# Patient Record
Sex: FEMALE | Race: WHITE | Employment: UNEMPLOYED | URBAN - METROPOLITAN AREA
[De-identification: names, ages, dates, MRNs, and addresses within clinical notes are randomized per-mention and may not be internally consistent; named-entity substitution may affect disease eponyms.]

---

## 2022-05-29 ENCOUNTER — HOSPITAL ENCOUNTER (EMERGENCY)
Age: 67
Discharge: HOME | End: 2022-05-29
Payer: COMMERCIAL

## 2022-05-29 ENCOUNTER — APPOINTMENT (OUTPATIENT)
Dept: GENERAL RADIOLOGY | Age: 67
End: 2022-05-29
Payer: COMMERCIAL

## 2022-05-29 VITALS
BODY MASS INDEX: 27.3 KG/M2 | WEIGHT: 144.62 LBS | HEIGHT: 61 IN | HEART RATE: 78 BPM | OXYGEN SATURATION: 98 % | SYSTOLIC BLOOD PRESSURE: 142 MMHG | DIASTOLIC BLOOD PRESSURE: 78 MMHG | RESPIRATION RATE: 16 BRPM | TEMPERATURE: 98.1 F
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
## 2022-05-29 DIAGNOSIS — S61.451A DOG BITE OF RIGHT HAND, INITIAL ENCOUNTER: Primary | ICD-10-CM

## 2022-05-29 LAB
ANION GAP SERPL CALCULATED.3IONS-SCNC: 15 MMOL/L (ref 3–16)
BASOPHILS ABSOLUTE: 0 K/UL (ref 0–0.2)
BASOPHILS RELATIVE PERCENT: 0.8 %
BUN BLDV-MCNC: 13 MG/DL (ref 7–20)
CALCIUM SERPL-MCNC: 9.1 MG/DL (ref 8.3–10.6)
CHLORIDE BLD-SCNC: 101 MMOL/L (ref 99–110)
CO2: 23 MMOL/L (ref 21–32)
CREAT SERPL-MCNC: 0.6 MG/DL (ref 0.6–1.2)
EOSINOPHILS ABSOLUTE: 0.1 K/UL (ref 0–0.6)
EOSINOPHILS RELATIVE PERCENT: 2.4 %
GFR AFRICAN AMERICAN: >60
GFR NON-AFRICAN AMERICAN: >60
GLUCOSE BLD-MCNC: 99 MG/DL (ref 70–99)
HCT VFR BLD CALC: 43.4 % (ref 36–48)
HEMOGLOBIN: 14.4 G/DL (ref 12–16)
LACTIC ACID: 1.1 MMOL/L (ref 0.4–2)
LYMPHOCYTES ABSOLUTE: 1.2 K/UL (ref 1–5.1)
LYMPHOCYTES RELATIVE PERCENT: 21.5 %
MCH RBC QN AUTO: 29 PG (ref 26–34)
MCHC RBC AUTO-ENTMCNC: 33.2 G/DL (ref 31–36)
MCV RBC AUTO: 87.3 FL (ref 80–100)
MONOCYTES ABSOLUTE: 0.3 K/UL (ref 0–1.3)
MONOCYTES RELATIVE PERCENT: 5.7 %
NEUTROPHILS ABSOLUTE: 3.7 K/UL (ref 1.7–7.7)
NEUTROPHILS RELATIVE PERCENT: 69.6 %
PDW BLD-RTO: 13.5 % (ref 12.4–15.4)
PLATELET # BLD: 231 K/UL (ref 135–450)
PMV BLD AUTO: 8.8 FL (ref 5–10.5)
POTASSIUM SERPL-SCNC: 4.1 MMOL/L (ref 3.5–5.1)
RBC # BLD: 4.97 M/UL (ref 4–5.2)
SODIUM BLD-SCNC: 139 MMOL/L (ref 136–145)
WBC # BLD: 5.4 K/UL (ref 4–11)

## 2022-05-29 PROCEDURE — 83605 ASSAY OF LACTIC ACID: CPT

## 2022-05-29 PROCEDURE — 80048 BASIC METABOLIC PNL TOTAL CA: CPT

## 2022-05-29 PROCEDURE — 99284 EMERGENCY DEPT VISIT MOD MDM: CPT

## 2022-05-29 PROCEDURE — 36415 COLL VENOUS BLD VENIPUNCTURE: CPT

## 2022-05-29 PROCEDURE — 87040 BLOOD CULTURE FOR BACTERIA: CPT

## 2022-05-29 PROCEDURE — 73130 X-RAY EXAM OF HAND: CPT

## 2022-05-29 PROCEDURE — 85025 COMPLETE CBC W/AUTO DIFF WBC: CPT

## 2022-05-29 RX ORDER — ACETAMINOPHEN 500 MG
1000 TABLET ORAL ONCE
Status: DISCONTINUED | OUTPATIENT
Start: 2022-05-29 | End: 2022-05-29 | Stop reason: HOSPADM

## 2022-05-29 NOTE — ED PROVIDER NOTES
**ADVANCED PRACTICE PROVIDER, I HAVE EVALUATED THIS PATIENT**        [address]      Pt Name: Sandhya Quintana  ZFZ:[id number]  [place] 1955  Date of evaluation: 5/29/2022  Provider: Nicolette Aguilera PA-C      Chief Complaint:    Chief Complaint   Patient presents with    Animal Bite     Bit by neighbor's dog on right hand/wrist area on Tuesday. Was seen in ED and steri-stripped. Was to follow-up next week but has had on & off fevers with drainage. Seen at Urgent Care this morning and sent here. Nursing Notes, Past Medical Hx, Past Surgical Hx, Social Hx, Allergies, and Family Hx were all reviewed and agreed with or any disagreements were addressed in the HPI.    HPI: (Location, Duration, Timing, Severity, Quality, Assoc Sx, Context, Modifying factors)    Chief Complaint of complaint of here for wound check for dog bite to her right hand. She was bit by her neighbors dog on Tuesday. Says she has had some off-and-on fever. And she noticed drainage from her hand. She denies fever at this time. No lightheaded or dizziness. No chest pain, no weakness. She went to urgent care and they told her to come to the emergency room. She has Steri-Strips and suture in place. Denies numbness or tingling. No other complaints. This is a  69 y.o. female who presents to the emergency room with the above complaint.     PastMedical/Surgical History:      Diagnosis Date    Hyperlipidemia     Thyroid disease          Procedure Laterality Date    APPENDECTOMY      FISTULAGRAM      30 years ago        Medications:  Previous Medications    AMOXICILLIN-POT CLAVULANATE (AUGMENTIN PO)    Take by mouth    ATORVASTATIN CALCIUM PO    Take by mouth    CYANOCOBALAMIN (VITAMIN B-12 PO)    Take by mouth    LEVOTHYROXINE SODIUM PO    Take by mouth    VITAMIN D PO    Take by mouth         Review of Systems:  (2-9 systems needed)  Review of Systems Constitutional: Negative for chills and fever. HENT: Negative for congestion and sore throat. Eyes: Negative for pain and visual disturbance. Respiratory: Negative for cough and shortness of breath. Cardiovascular: Negative for chest pain and leg swelling. Gastrointestinal: Negative for abdominal pain, nausea and vomiting. Genitourinary: Negative for dysuria and frequency. Musculoskeletal: Negative for back pain and neck pain. Skin: Negative for rash and wound. Neurological: Negative for dizziness and light-headedness. \"Positives and Pertinent negatives as per HPI\"    Physical Exam:  Physical Exam  Vitals and nursing note reviewed. Cardiovascular:      Rate and Rhythm: Normal rate and regular rhythm. Heart sounds: Normal heart sounds. No murmur heard. No friction rub. No gallop. Pulmonary:      Effort: Pulmonary effort is normal. No respiratory distress. Breath sounds: Normal breath sounds. No wheezing or rales. Chest:      Chest wall: No tenderness. Musculoskeletal:      Right hand: Swelling, laceration and tenderness present. No deformity or bony tenderness. Normal range of motion. Normal strength. Normal sensation. Normal capillary refill. Normal pulse. MEDICAL DECISION MAKING    Vitals:    Vitals:    05/29/22 1143   BP: (!) 152/83   Pulse: 80   Resp: 16   Temp: 97.8 °F (36.6 °C)   TempSrc: Oral   SpO2: 96%   Weight: 144 lb 10 oz (65.6 kg)   Height: 5' 1\" (1.549 m)       LABS:  Labs Reviewed   CULTURE, BLOOD 1   CULTURE, BLOOD 2   CBC WITH AUTO DIFFERENTIAL   BASIC METABOLIC PANEL   LACTIC ACID        Remainder of labs reviewed and were negative at this time or not returned at the time of this note.     RADIOLOGY:   Non-plain film images such as CT, Ultrasound and MRI are read by the radiologist. Grey Bowser PA-C have directly visualized the radiologic plain film image(s) with the below findings:      Interpretation per the Radiologist below, if available at the time of this note:    XR HAND RIGHT (MIN 3 VIEWS)   Final Result   No radiographic abnormality. No results found. MEDICAL DECISION MAKING / ED COURSE:      PROCEDURES:   Procedures    None    Patient was given:  Medications   acetaminophen (TYLENOL) tablet 1,000 mg (has no administration in time range)     Emergency room course: Patient on exam cardiovascular regular rhythm, lungs are clear. No wheeze rales or rhonchi noted. Patient has full range of motion all extremities the right upper extremity shoulder, and elbow show full range of motion no tenderness. Distal wrist shows laceration with suture repair and Steri-Strip in place. There is slight swelling noted no erythema no drainage. She has full range of motion all digits radial pulse good at 2+ capillary refill less than 2 seconds all digit. She has slight swelling extending into the dorsum hand and some ecchymosis most likely from blood pooling down into her dorsal hand. She has no sign of erythema. She is alert oriented x4. Does not appear to be in acute distress. Lab result from today showed lactic acid 1.1. CBC within normal limits with a white count of 5.4. BMP unremarkable. X-ray of the right hand shows no radiographic abnormalities. At this point I did discuss patient x-ray and her lab results. I informed her that does not look like she has any sign of infection just more swelling. Explained to her that ecchymosis or the swelling in the dorsum hand is from the blood pooling from the wrist into her dorsum hand. This is usually due to gravity. She was concerned about cleaning because she was told not to get it wet. I inform her she can get the Steri-Strips wet. Just do not scrub or pull on them. And when she dry her hands just blot dry. Do not soak in water for any long period of time. I will refer her to Dr. Deena Castro to follow-up with hand surgeon. Return for any worsening.   She was okay with this plan. Continue the antibiotics the Augmentin she was given. And return for any worsening. The patient tolerated their visit well. I evaluated the patient. The physician was available for consultation as needed. The patient and / or the family were informed of the results of any tests, a time was given to answer questions, a plan was proposed and they agreed with plan. CLINICAL IMPRESSION:  1.  Dog bite of right hand, initial encounter        DISPOSITION Decision To Discharge 05/29/2022 03:42:26 PM      PATIENT REFERRED TO:  Didi Quiros, [id number] [facility] [address]  [phone]    Call   As needed    Ernesto Kwan MD  [address] [place] [address]  [phone]    Call in 2 days        DISCHARGE MEDICATIONS:  New Prescriptions    No medications on file       DISCONTINUED MEDICATIONS:  Discontinued Medications    No medications on file              (Please note the MDM and HPI sections of this note were completed with a voice recognition program.  Efforts were made to edit the dictations but occasionally words are mis-transcribed.)    Electronically signed, Jad Gooden PA-C,          Jad Gooden PA-C  05/30/22 3708

## 2022-05-30 ASSESSMENT — ENCOUNTER SYMPTOMS
EYE PAIN: 0
BACK PAIN: 0
ABDOMINAL PAIN: 0
SORE THROAT: 0
SHORTNESS OF BREATH: 0
NAUSEA: 0
VOMITING: 0
COUGH: 0

## 2022-05-31 ENCOUNTER — TELEPHONE (OUTPATIENT)
Dept: ORTHOPEDIC SURGERY | Age: 67
End: 2022-05-31

## 2022-05-31 SDOH — HEALTH STABILITY: PHYSICAL HEALTH: ON AVERAGE, HOW MANY MINUTES DO YOU ENGAGE IN EXERCISE AT THIS LEVEL?: 70 MIN

## 2022-05-31 SDOH — HEALTH STABILITY: PHYSICAL HEALTH: ON AVERAGE, HOW MANY DAYS PER WEEK DO YOU ENGAGE IN MODERATE TO STRENUOUS EXERCISE (LIKE A BRISK WALK)?: 6 DAYS

## 2022-05-31 NOTE — TELEPHONE ENCOUNTER
Spoke with patient's  and patient. Made them an appointment to see Dr. Queen Rothman on 6/1 out at the [facility]. Gave patient address and patient was made aware of the location.

## 2022-05-31 NOTE — TELEPHONE ENCOUNTER
Appointment Request     Patient requesting earlier appointment: Yes  Appointment offered to patient: NO  Patient Contact Number: [phone]    PATIENT WAS SEEN IN THE ED AT [facility]. PATIENT WOULD LIKE TO BE SEEN AT THE [facility].

## 2022-06-01 ENCOUNTER — OFFICE VISIT (OUTPATIENT)
Dept: ORTHOPEDIC SURGERY | Age: 67
End: 2022-06-01
Payer: COMMERCIAL

## 2022-06-01 VITALS — BODY MASS INDEX: 27.19 KG/M2 | HEIGHT: 61 IN | RESPIRATION RATE: 16 BRPM | WEIGHT: 144 LBS
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
## 2022-06-01 DIAGNOSIS — S61.451A DOG BITE, HAND, RIGHT, INITIAL ENCOUNTER: Primary | ICD-10-CM

## 2022-06-01 PROCEDURE — 99204 OFFICE O/P NEW MOD 45 MIN: CPT | Performed by: ORTHOPAEDIC SURGERY

## 2022-06-01 PROCEDURE — 1123F ACP DISCUSS/DSCN MKR DOCD: CPT | Performed by: ORTHOPAEDIC SURGERY

## 2022-06-01 NOTE — PROGRESS NOTES
This 69 y.o. right hand dominant  is seen in referral for the ED at [facility] with a chief complaint of an injury to the right hand. The injury occurred 1 week ago when the patient was bitten by a neighbor's dog. She noticed pain, bleeding. The patient was evaluated at the [address] [place] [address] facility where the wounds were  Cleaned and steri strips applied. A lacerated tendon was not suspected. A lacerated nerve was not suspected. A foreign body was not found. Antibiotics were prescribed. Tetanus prophylaxis was updated. Because of some apparent cellulitis she was seen 5 days later at [facility] ED. The patient was sent for hand surgery consultation. The pain assessment has been reviewed and is correct. The patient's social history, past medical history, family history, medications, allergies and review of systems, entered 6/1/22, have been reviewed, and dated and are recorded in the chart. On physical examination the patient is Height: 5' 1\" (154.9 cm) tall and weighs Weight: 144 lb (65.3 kg). Respirations are 18 per minute. The patient is well nourished, is oriented to time and place, demonstrates appropriate mood and affect as well as normal gait and station. There are two lacerations on the dorsum of the right hand, covered by steri strips which appear to be clean and dry. There is absent drainage. There is absent sign of infection. Range of motion of the fingers is mildly limited, but all tendons are functioning. Distal sensation  is normal.  Distal circulation is intact. There is no deformity. All joints are stable. Deep tendon reflexes are present bilaterally. There is no cellulitis or lymphangitis. There is no proximal adenopathy. There is no sign of additional significant injury to the opposite upper extremity.     I have personally reviewed and interpreted all previous external imaging studies(hand Xrays), laboratory tests(Blood cultures, BMP, CBC+diff), diagnostic procedures and medical encounters pertinent to this patient's visit today. Review and independent interpretation of the recent external Xrays of the right hand demonstrate no fracture, dislocation or foreign body. .    Impression: Lacerations right hand due to dog bites without significant tendon, bone injury or clinical evidence of infection. The nature of this medical problem is fully discussed with the patient and , including all treatment options. All questions are answered. They are instructed about activity precautions, dressing care and a range of motion exercise program, which is fully discussed and demonstrated. The usual course of events in the resolution of the symptoms associated with this condition is fully discussed with the patient. As long as they progress as expected, they do not need to return for further follow up. They are, however, urged to call or return if they have questions or concerns or if full painless function of their hand has not returned by 2 weeks from today.

## 2022-06-02 LAB
BLOOD CULTURE, ROUTINE: NORMAL
CULTURE, BLOOD 2: NORMAL

## 2023-01-26 ENCOUNTER — TELEPHONE (OUTPATIENT)
Dept: ORTHOPEDIC SURGERY | Age: 68
End: 2023-01-26

## 2023-01-26 NOTE — TELEPHONE ENCOUNTER
Called Patient, Verified     Obtained medical records for patient from Dr. Matthew Mondragon and will send out in Mail come the next couple of days. Patient understands this and is thankful for the call.